# Patient Record
Sex: MALE | Race: WHITE | NOT HISPANIC OR LATINO | Employment: FULL TIME | URBAN - METROPOLITAN AREA
[De-identification: names, ages, dates, MRNs, and addresses within clinical notes are randomized per-mention and may not be internally consistent; named-entity substitution may affect disease eponyms.]

---

## 2017-04-19 ENCOUNTER — MEDICAL CORRESPONDENCE (OUTPATIENT)
Dept: TRANSPLANT | Facility: CLINIC | Age: 42
End: 2017-04-19

## 2017-08-10 ENCOUNTER — TRANSFERRED RECORDS (OUTPATIENT)
Dept: HEALTH INFORMATION MANAGEMENT | Facility: CLINIC | Age: 42
End: 2017-08-10

## 2017-12-18 ENCOUNTER — TRANSFERRED RECORDS (OUTPATIENT)
Dept: HEALTH INFORMATION MANAGEMENT | Facility: CLINIC | Age: 42
End: 2017-12-18

## 2018-04-16 ENCOUNTER — TRANSFERRED RECORDS (OUTPATIENT)
Dept: HEALTH INFORMATION MANAGEMENT | Facility: CLINIC | Age: 43
End: 2018-04-16

## 2018-08-17 ENCOUNTER — TRANSFERRED RECORDS (OUTPATIENT)
Dept: HEALTH INFORMATION MANAGEMENT | Facility: CLINIC | Age: 43
End: 2018-08-17

## 2019-06-11 ENCOUNTER — TRANSFERRED RECORDS (OUTPATIENT)
Dept: HEALTH INFORMATION MANAGEMENT | Facility: CLINIC | Age: 44
End: 2019-06-11

## 2019-11-03 ENCOUNTER — HEALTH MAINTENANCE LETTER (OUTPATIENT)
Age: 44
End: 2019-11-03

## 2019-12-19 ENCOUNTER — TRANSFERRED RECORDS (OUTPATIENT)
Dept: HEALTH INFORMATION MANAGEMENT | Facility: CLINIC | Age: 44
End: 2019-12-19

## 2020-11-16 ENCOUNTER — HEALTH MAINTENANCE LETTER (OUTPATIENT)
Age: 45
End: 2020-11-16

## 2020-12-07 ENCOUNTER — TRANSFERRED RECORDS (OUTPATIENT)
Dept: HEALTH INFORMATION MANAGEMENT | Facility: CLINIC | Age: 45
End: 2020-12-07

## 2021-09-18 ENCOUNTER — HEALTH MAINTENANCE LETTER (OUTPATIENT)
Age: 46
End: 2021-09-18

## 2022-01-08 ENCOUNTER — HEALTH MAINTENANCE LETTER (OUTPATIENT)
Age: 47
End: 2022-01-08

## 2022-03-22 ENCOUNTER — HOSPITAL ENCOUNTER (OUTPATIENT)
Dept: GENERAL RADIOLOGY | Facility: CLINIC | Age: 47
Discharge: HOME | End: 2022-03-22
Attending: INTERNAL MEDICINE | Admitting: INTERNAL MEDICINE
Payer: COMMERCIAL

## 2022-03-22 DIAGNOSIS — R05.3 PERSISTENT COUGH: ICD-10-CM

## 2022-03-22 DIAGNOSIS — C90.01 MULTIPLE MYELOMA IN REMISSION (H): ICD-10-CM

## 2022-03-22 PROCEDURE — 71046 X-RAY EXAM CHEST 2 VIEWS: CPT

## 2022-11-20 ENCOUNTER — HEALTH MAINTENANCE LETTER (OUTPATIENT)
Age: 47
End: 2022-11-20

## 2023-01-25 RX ORDER — CLOBETASOL PROPIONATE 0.5 MG/G
CREAM TOPICAL
COMMUNITY
Start: 2022-06-27

## 2023-01-25 RX ORDER — CALCIPOTRIENE 50 UG/G
OINTMENT TOPICAL
COMMUNITY
Start: 2022-02-15

## 2023-01-26 ENCOUNTER — HOSPITAL ENCOUNTER (OUTPATIENT)
Facility: CLINIC | Age: 48
Discharge: HOME | End: 2023-01-26
Attending: OTOLARYNGOLOGY | Admitting: OTOLARYNGOLOGY
Payer: COMMERCIAL

## 2023-01-26 ENCOUNTER — ANESTHESIA (OUTPATIENT)
Dept: SURGERY | Facility: CLINIC | Age: 48
End: 2023-01-26
Payer: COMMERCIAL

## 2023-01-26 ENCOUNTER — ANESTHESIA EVENT (OUTPATIENT)
Dept: SURGERY | Facility: CLINIC | Age: 48
End: 2023-01-26
Payer: COMMERCIAL

## 2023-01-26 VITALS
BODY MASS INDEX: 26.91 KG/M2 | HEART RATE: 65 BPM | HEIGHT: 72 IN | SYSTOLIC BLOOD PRESSURE: 123 MMHG | RESPIRATION RATE: 16 BRPM | DIASTOLIC BLOOD PRESSURE: 78 MMHG | WEIGHT: 198.7 LBS | OXYGEN SATURATION: 98 % | TEMPERATURE: 97.5 F

## 2023-01-26 DIAGNOSIS — C09.9 SQUAMOUS CELL CARCINOMA OF LEFT TONSIL (H): Primary | ICD-10-CM

## 2023-01-26 LAB
LAB DIRECTOR COMMENTS: ABNORMAL
LAB DIRECTOR DISCLAIMER: ABNORMAL
LAB DIRECTOR INTERPRETATION: ABNORMAL
LAB DIRECTOR METHODOLOGY: ABNORMAL
LAB DIRECTOR RESULTS: ABNORMAL
SPECIMEN DESCRIPTION: ABNORMAL

## 2023-01-26 PROCEDURE — 250N000009 HC RX 250: Performed by: NURSE ANESTHETIST, CERTIFIED REGISTERED

## 2023-01-26 PROCEDURE — 250N000013 HC RX MED GY IP 250 OP 250 PS 637: Performed by: ANESTHESIOLOGY

## 2023-01-26 PROCEDURE — 710N000009 HC RECOVERY PHASE 1, LEVEL 1, PER MIN: Performed by: OTOLARYNGOLOGY

## 2023-01-26 PROCEDURE — 710N000012 HC RECOVERY PHASE 2, PER MINUTE: Performed by: OTOLARYNGOLOGY

## 2023-01-26 PROCEDURE — 360N000076 HC SURGERY LEVEL 3, PER MIN: Performed by: OTOLARYNGOLOGY

## 2023-01-26 PROCEDURE — 250N000009 HC RX 250: Performed by: OTOLARYNGOLOGY

## 2023-01-26 PROCEDURE — 258N000003 HC RX IP 258 OP 636: Performed by: ANESTHESIOLOGY

## 2023-01-26 PROCEDURE — 250N000025 HC SEVOFLURANE, PER MIN: Performed by: OTOLARYNGOLOGY

## 2023-01-26 PROCEDURE — G0452 MOLECULAR PATHOLOGY INTERPR: HCPCS | Mod: 26 | Performed by: STUDENT IN AN ORGANIZED HEALTH CARE EDUCATION/TRAINING PROGRAM

## 2023-01-26 PROCEDURE — 88342 IMHCHEM/IMCYTCHM 1ST ANTB: CPT | Mod: 26 | Performed by: PATHOLOGY

## 2023-01-26 PROCEDURE — 250N000011 HC RX IP 250 OP 636: Performed by: NURSE ANESTHETIST, CERTIFIED REGISTERED

## 2023-01-26 PROCEDURE — 370N000017 HC ANESTHESIA TECHNICAL FEE, PER MIN: Performed by: OTOLARYNGOLOGY

## 2023-01-26 PROCEDURE — 87624 HPV HI-RISK TYP POOLED RSLT: CPT | Performed by: OTOLARYNGOLOGY

## 2023-01-26 PROCEDURE — 88360 TUMOR IMMUNOHISTOCHEM/MANUAL: CPT | Mod: 26 | Performed by: PATHOLOGY

## 2023-01-26 PROCEDURE — 272N000001 HC OR GENERAL SUPPLY STERILE: Performed by: OTOLARYNGOLOGY

## 2023-01-26 PROCEDURE — 88304 TISSUE EXAM BY PATHOLOGIST: CPT | Mod: TC | Performed by: OTOLARYNGOLOGY

## 2023-01-26 PROCEDURE — 88304 TISSUE EXAM BY PATHOLOGIST: CPT | Mod: 26 | Performed by: PATHOLOGY

## 2023-01-26 PROCEDURE — 999N000141 HC STATISTIC PRE-PROCEDURE NURSING ASSESSMENT: Performed by: OTOLARYNGOLOGY

## 2023-01-26 RX ORDER — ONDANSETRON 4 MG/1
4 TABLET, ORALLY DISINTEGRATING ORAL EVERY 30 MIN PRN
Status: DISCONTINUED | OUTPATIENT
Start: 2023-01-26 | End: 2023-01-26 | Stop reason: HOSPADM

## 2023-01-26 RX ORDER — NALOXONE HYDROCHLORIDE 0.4 MG/ML
0.2 INJECTION, SOLUTION INTRAMUSCULAR; INTRAVENOUS; SUBCUTANEOUS
Status: DISCONTINUED | OUTPATIENT
Start: 2023-01-26 | End: 2023-01-26 | Stop reason: HOSPADM

## 2023-01-26 RX ORDER — NALOXONE HYDROCHLORIDE 0.4 MG/ML
0.4 INJECTION, SOLUTION INTRAMUSCULAR; INTRAVENOUS; SUBCUTANEOUS
Status: DISCONTINUED | OUTPATIENT
Start: 2023-01-26 | End: 2023-01-26 | Stop reason: HOSPADM

## 2023-01-26 RX ORDER — FENTANYL CITRATE 50 UG/ML
50 INJECTION, SOLUTION INTRAMUSCULAR; INTRAVENOUS EVERY 5 MIN PRN
Status: DISCONTINUED | OUTPATIENT
Start: 2023-01-26 | End: 2023-01-26 | Stop reason: HOSPADM

## 2023-01-26 RX ORDER — SODIUM CHLORIDE, SODIUM LACTATE, POTASSIUM CHLORIDE, CALCIUM CHLORIDE 600; 310; 30; 20 MG/100ML; MG/100ML; MG/100ML; MG/100ML
INJECTION, SOLUTION INTRAVENOUS CONTINUOUS
Status: DISCONTINUED | OUTPATIENT
Start: 2023-01-26 | End: 2023-01-26 | Stop reason: HOSPADM

## 2023-01-26 RX ORDER — LIDOCAINE 40 MG/G
CREAM TOPICAL
Status: DISCONTINUED | OUTPATIENT
Start: 2023-01-26 | End: 2023-01-26 | Stop reason: HOSPADM

## 2023-01-26 RX ORDER — ONDANSETRON 2 MG/ML
4 INJECTION INTRAMUSCULAR; INTRAVENOUS EVERY 30 MIN PRN
Status: DISCONTINUED | OUTPATIENT
Start: 2023-01-26 | End: 2023-01-26 | Stop reason: HOSPADM

## 2023-01-26 RX ORDER — ONDANSETRON 8 MG/1
8 TABLET, ORALLY DISINTEGRATING ORAL EVERY 8 HOURS PRN
Qty: 10 TABLET | Refills: 1 | Status: SHIPPED | OUTPATIENT
Start: 2023-01-26

## 2023-01-26 RX ORDER — HYDROMORPHONE HCL IN WATER/PF 6 MG/30 ML
0.4 PATIENT CONTROLLED ANALGESIA SYRINGE INTRAVENOUS EVERY 5 MIN PRN
Status: DISCONTINUED | OUTPATIENT
Start: 2023-01-26 | End: 2023-01-26 | Stop reason: HOSPADM

## 2023-01-26 RX ORDER — ONDANSETRON 4 MG/1
4 TABLET, ORALLY DISINTEGRATING ORAL
Status: DISCONTINUED | OUTPATIENT
Start: 2023-01-26 | End: 2023-01-26 | Stop reason: HOSPADM

## 2023-01-26 RX ORDER — HYDROMORPHONE HCL IN WATER/PF 6 MG/30 ML
0.2 PATIENT CONTROLLED ANALGESIA SYRINGE INTRAVENOUS EVERY 5 MIN PRN
Status: DISCONTINUED | OUTPATIENT
Start: 2023-01-26 | End: 2023-01-26 | Stop reason: HOSPADM

## 2023-01-26 RX ORDER — LIDOCAINE HYDROCHLORIDE AND EPINEPHRINE 10; 10 MG/ML; UG/ML
INJECTION, SOLUTION INFILTRATION; PERINEURAL PRN
Status: DISCONTINUED | OUTPATIENT
Start: 2023-01-26 | End: 2023-01-26 | Stop reason: HOSPADM

## 2023-01-26 RX ORDER — DEXAMETHASONE SODIUM PHOSPHATE 4 MG/ML
INJECTION, SOLUTION INTRA-ARTICULAR; INTRALESIONAL; INTRAMUSCULAR; INTRAVENOUS; SOFT TISSUE PRN
Status: DISCONTINUED | OUTPATIENT
Start: 2023-01-26 | End: 2023-01-26

## 2023-01-26 RX ORDER — ONDANSETRON 2 MG/ML
INJECTION INTRAMUSCULAR; INTRAVENOUS PRN
Status: DISCONTINUED | OUTPATIENT
Start: 2023-01-26 | End: 2023-01-26

## 2023-01-26 RX ORDER — FENTANYL CITRATE 50 UG/ML
25 INJECTION, SOLUTION INTRAMUSCULAR; INTRAVENOUS EVERY 5 MIN PRN
Status: DISCONTINUED | OUTPATIENT
Start: 2023-01-26 | End: 2023-01-26 | Stop reason: HOSPADM

## 2023-01-26 RX ORDER — ACETAMINOPHEN 325 MG/1
975 TABLET ORAL ONCE
Status: DISCONTINUED | OUTPATIENT
Start: 2023-01-26 | End: 2023-01-26 | Stop reason: HOSPADM

## 2023-01-26 RX ORDER — OXYCODONE HCL 5 MG/5 ML
5-10 SOLUTION, ORAL ORAL EVERY 6 HOURS PRN
Qty: 300 ML | Refills: 0 | Status: SHIPPED | OUTPATIENT
Start: 2023-01-26 | End: 2023-01-31

## 2023-01-26 RX ORDER — PROPOFOL 10 MG/ML
INJECTION, EMULSION INTRAVENOUS PRN
Status: DISCONTINUED | OUTPATIENT
Start: 2023-01-26 | End: 2023-01-26

## 2023-01-26 RX ORDER — OXYCODONE HYDROCHLORIDE 5 MG/1
5 TABLET ORAL EVERY 4 HOURS PRN
Status: DISCONTINUED | OUTPATIENT
Start: 2023-01-26 | End: 2023-01-26 | Stop reason: HOSPADM

## 2023-01-26 RX ORDER — LIDOCAINE HYDROCHLORIDE 10 MG/ML
INJECTION, SOLUTION INFILTRATION; PERINEURAL PRN
Status: DISCONTINUED | OUTPATIENT
Start: 2023-01-26 | End: 2023-01-26

## 2023-01-26 RX ORDER — MAGNESIUM HYDROXIDE 1200 MG/15ML
LIQUID ORAL PRN
Status: DISCONTINUED | OUTPATIENT
Start: 2023-01-26 | End: 2023-01-26 | Stop reason: HOSPADM

## 2023-01-26 RX ORDER — DEXAMETHASONE SODIUM PHOSPHATE 4 MG/ML
10 INJECTION, SOLUTION INTRA-ARTICULAR; INTRALESIONAL; INTRAMUSCULAR; INTRAVENOUS; SOFT TISSUE ONCE
Status: DISCONTINUED | OUTPATIENT
Start: 2023-01-26 | End: 2023-01-26 | Stop reason: HOSPADM

## 2023-01-26 RX ORDER — METOPROLOL TARTRATE 1 MG/ML
1-2 INJECTION, SOLUTION INTRAVENOUS EVERY 5 MIN PRN
Status: DISCONTINUED | OUTPATIENT
Start: 2023-01-26 | End: 2023-01-26 | Stop reason: HOSPADM

## 2023-01-26 RX ORDER — FENTANYL CITRATE 50 UG/ML
INJECTION, SOLUTION INTRAMUSCULAR; INTRAVENOUS PRN
Status: DISCONTINUED | OUTPATIENT
Start: 2023-01-26 | End: 2023-01-26

## 2023-01-26 RX ORDER — HYDROCODONE BITARTRATE AND ACETAMINOPHEN 7.5; 325 MG/15ML; MG/15ML
10 SOLUTION ORAL
Status: DISCONTINUED | OUTPATIENT
Start: 2023-01-26 | End: 2023-01-26 | Stop reason: HOSPADM

## 2023-01-26 RX ORDER — FENTANYL CITRATE 50 UG/ML
50 INJECTION, SOLUTION INTRAMUSCULAR; INTRAVENOUS
Status: DISCONTINUED | OUTPATIENT
Start: 2023-01-26 | End: 2023-01-26 | Stop reason: HOSPADM

## 2023-01-26 RX ORDER — ACETAMINOPHEN 325 MG/1
975 TABLET ORAL ONCE
Status: COMPLETED | OUTPATIENT
Start: 2023-01-26 | End: 2023-01-26

## 2023-01-26 RX ORDER — HYDRALAZINE HYDROCHLORIDE 20 MG/ML
2.5-5 INJECTION INTRAMUSCULAR; INTRAVENOUS EVERY 10 MIN PRN
Status: DISCONTINUED | OUTPATIENT
Start: 2023-01-26 | End: 2023-01-26 | Stop reason: HOSPADM

## 2023-01-26 RX ORDER — OXYCODONE HYDROCHLORIDE 5 MG/1
10 TABLET ORAL EVERY 4 HOURS PRN
Status: DISCONTINUED | OUTPATIENT
Start: 2023-01-26 | End: 2023-01-26 | Stop reason: HOSPADM

## 2023-01-26 RX ORDER — ACETAMINOPHEN 325 MG/1
650 TABLET ORAL
Status: DISCONTINUED | OUTPATIENT
Start: 2023-01-26 | End: 2023-01-26 | Stop reason: HOSPADM

## 2023-01-26 RX ADMIN — FENTANYL CITRATE 100 MCG: 50 INJECTION, SOLUTION INTRAMUSCULAR; INTRAVENOUS at 12:54

## 2023-01-26 RX ADMIN — ROCURONIUM BROMIDE 30 MG: 50 INJECTION, SOLUTION INTRAVENOUS at 12:36

## 2023-01-26 RX ADMIN — DEXAMETHASONE SODIUM PHOSPHATE 2 MG: 4 INJECTION, SOLUTION INTRA-ARTICULAR; INTRALESIONAL; INTRAMUSCULAR; INTRAVENOUS; SOFT TISSUE at 13:24

## 2023-01-26 RX ADMIN — ACETAMINOPHEN 975 MG: 325 TABLET, FILM COATED ORAL at 14:09

## 2023-01-26 RX ADMIN — LIDOCAINE HYDROCHLORIDE 50 MG: 10 INJECTION, SOLUTION INFILTRATION; PERINEURAL at 12:36

## 2023-01-26 RX ADMIN — FENTANYL CITRATE 150 MCG: 50 INJECTION, SOLUTION INTRAMUSCULAR; INTRAVENOUS at 12:36

## 2023-01-26 RX ADMIN — ROCURONIUM BROMIDE 10 MG: 50 INJECTION, SOLUTION INTRAVENOUS at 12:57

## 2023-01-26 RX ADMIN — SODIUM CHLORIDE, POTASSIUM CHLORIDE, SODIUM LACTATE AND CALCIUM CHLORIDE: 600; 310; 30; 20 INJECTION, SOLUTION INTRAVENOUS at 11:37

## 2023-01-26 RX ADMIN — PROPOFOL 200 MG: 10 INJECTION, EMULSION INTRAVENOUS at 12:36

## 2023-01-26 RX ADMIN — SODIUM CHLORIDE, POTASSIUM CHLORIDE, SODIUM LACTATE AND CALCIUM CHLORIDE: 600; 310; 30; 20 INJECTION, SOLUTION INTRAVENOUS at 13:25

## 2023-01-26 RX ADMIN — ONDANSETRON 4 MG: 2 INJECTION INTRAMUSCULAR; INTRAVENOUS at 13:26

## 2023-01-26 RX ADMIN — PROPOFOL 50 MG: 10 INJECTION, EMULSION INTRAVENOUS at 12:55

## 2023-01-26 RX ADMIN — MIDAZOLAM 2 MG: 1 INJECTION INTRAMUSCULAR; INTRAVENOUS at 12:30

## 2023-01-26 RX ADMIN — DEXAMETHASONE SODIUM PHOSPHATE 8 MG: 4 INJECTION, SOLUTION INTRA-ARTICULAR; INTRALESIONAL; INTRAMUSCULAR; INTRAVENOUS; SOFT TISSUE at 12:36

## 2023-01-26 RX ADMIN — SUGAMMADEX 200 MG: 100 INJECTION, SOLUTION INTRAVENOUS at 13:29

## 2023-01-26 ASSESSMENT — ACTIVITIES OF DAILY LIVING (ADL)
ADLS_ACUITY_SCORE: 37

## 2023-01-26 NOTE — ANESTHESIA POSTPROCEDURE EVALUATION
Patient: Raphael Menon    Procedure: Procedure(s):  Tonsillectomy  Direct laryngoscopy with esophagoscopy       Anesthesia Type:  General    Note:  Disposition: Outpatient   Postop Pain Control: Uneventful            Sign Out: Well controlled pain   PONV: No   Neuro/Psych: Uneventful            Sign Out: Acceptable/Baseline neuro status   Airway/Respiratory: Uneventful            Sign Out: Acceptable/Baseline resp. status   CV/Hemodynamics: Uneventful            Sign Out: Acceptable CV status; No obvious hypovolemia; No obvious fluid overload   Other NRE: NONE   DID A NON-ROUTINE EVENT OCCUR? No           Last vitals:  Vitals Value Taken Time   /99 01/26/23 1409   Temp 98  F (36.7  C) 01/26/23 1409   Pulse 72 01/26/23 1420   Resp 14 01/26/23 1420   SpO2 99 % 01/26/23 1420   Vitals shown include unvalidated device data.    Electronically Signed By: Khadar Gandara MD  January 26, 2023  2:42 PM

## 2023-01-26 NOTE — BRIEF OP NOTE
[facility]    Brief Operative Note    Pre-operative diagnosis: Tonsillar mass [J35.8]  Post-operative diagnosis tonsil cancer    Procedure: Procedure(s):  Tonsillectomy  Direct laryngoscopy with esophagoscopy  Surgeon: Surgeon(s) and Role:     * Ryan Acosta MD - Primary  Anesthesia: General   Estimated Blood Loss: 25 mL from 1/26/2023 12:29 PM to 1/26/2023  1:35 PM      Drains: None  Specimens:   ID Type Source Tests Collected by Time Destination   1 : left tonsil Tissue Tonsil, Left SURGICAL PATHOLOGY EXAM Ryan Acosta MD 1/26/2023 12:51 PM    2 : right tonsil Tissue Tonsil, Right SURGICAL PATHOLOGY EXAM Ryan Acosta MD 1/26/2023 12:51 PM      Findings:   left tonsil appears to have cancer  Complications: None.  Implants: * No implants in log *      Ryan Acosta M.D.

## 2023-01-26 NOTE — PROGRESS NOTES
"SPIRITUAL HEALTH SERVICES Progress Note  RH Pre-Op    Saw pt Raphael AMATO Lorenajosesito per his request for  support before his procedure.  Macario reported that he was recently diagnosed with throat cancer and is having his tonsils removed today.  He expressed concern for his 11-year old and 9-year old daughters, \" I have to bring them along this journey.\"  Macario named his spouse, extended family, and friends as being part of his support network.  He identifies as \"a person of mey\" and is affiliated with [facility] in [place].  Macario welcomed prayer.     Plan: No further plans as pt anticipates discharging home after his procedure.    Arie Marquez M.Div., [facility]  Staff     [facility] routine referrals *[phone]  [facility] available 24/7 for emergent requests/referrals, either by paging the on-call  or by entering an ASAP/STAT consult in Epic (this will also page the on-call ).  "

## 2023-01-26 NOTE — ANESTHESIA PROCEDURE NOTES
Airway         Procedure Start/Stop Times: 1/26/2023 12:39 PM  Staff -        Anesthesiologist:  Khadar Gandara MD       CRNA: Parisa Hartman APRN CRNA       Performed By: anesthesiologist  Consent for Airway        Urgency: elective  Indications and Patient Condition       Indications for airway management: claude-procedural       Induction type:intravenous       Mask difficulty assessment: 1 - vent by mask    Final Airway Details       Final airway type: endotracheal airway       Successful airway: ETT - single, Oral and HANNAH  Endotracheal Airway Details        ETT size (mm): 8.0       Cuffed: yes       Successful intubation technique: direct laryngoscopy       DL Blade Type: MAC 3       Grade View of Cords: 1       Adjucts: stylet       Position: Left       Bite block used: None    Post intubation assessment        Placement verified by: capnometry, equal breath sounds and chest rise        Number of attempts at approach: 1       Number of other approaches attempted: 0       Secured with: plastic tape       Ease of procedure: easy       Dentition: Unchanged    Medication(s) Administered   Medication Administration Time: 1/26/2023 12:39 PM

## 2023-01-26 NOTE — ANESTHESIA CARE TRANSFER NOTE
Patient: Raphael Menon    Procedure: Procedure(s):  Tonsillectomy  Direct laryngoscopy with esophagoscopy       Diagnosis: Tonsillar mass [J35.8]  Diagnosis Additional Information: No value filed.    Anesthesia Type:   General     Note:    Oropharynx: oropharynx clear of all foreign objects and spontaneously breathing  Level of Consciousness: awake  Oxygen Supplementation: face mask  Level of Supplemental Oxygen (L/min / FiO2): 8  Independent Airway: airway patency satisfactory and stable  Dentition: dentition unchanged  Vital Signs Stable: post-procedure vital signs reviewed and stable  Report to RN Given: handoff report given  Patient transferred to: PACU  Comments: Pt to recovery.  Spontaneous respirations and exchanging well.  Pt making needs known.  Report given to RN.    Handoff Report: Identifed the Patient, Identified the Reponsible Provider, Reviewed the pertinent medical history, Discussed the surgical course, Reviewed Intra-OP anesthesia mangement and issues during anesthesia, Set expectations for post-procedure period and Allowed opportunity for questions and acknowledgement of understanding      Vitals:  Vitals Value Taken Time   BP     Temp     Pulse 89 01/26/23 1339   Resp 12 01/26/23 1339   SpO2 100 % 01/26/23 1339   Vitals shown include unvalidated device data.    Electronically Signed By: SUE Rodriguez CRNA  January 26, 2023  1:40 PM

## 2023-01-26 NOTE — ANESTHESIA PREPROCEDURE EVALUATION
Anesthesia Pre-Procedure Evaluation    Patient: Raphael Menon   MRN: [id number] : 1975        Procedure : Procedure(s):  Tonsillectomy  Direct laryngoscopy with esophagoscopy          Past Medical History:   Diagnosis Date     Cancer (H)     multiple myeloma     History of blood transfusion       Past Surgical History:   Procedure Laterality Date     BONE MARROW BIOPSY, BONE SPECIMEN, NEEDLE/TROCAR  2013    Procedure: BIOPSY BONE MARROW;  Bilateral Bone Marrow Biopsy;  Surgeon: Kirby Linares MD;  Location: [facility]     BONE MARROW BIOPSY, BONE SPECIMEN, NEEDLE/TROCAR  2013    Procedure: BIOPSY BONE MARROW;;  Surgeon: Kirby Linares MD;  Location:  GI     HEAD & NECK SURGERY      wisdom teeth pulled      Allergies   Allergen Reactions     Penicillins Rash      Social History     Tobacco Use     Smoking status: Never     Smokeless tobacco: Never   Substance Use Topics     Alcohol use: Yes     Comment: occas      Wt Readings from Last 1 Encounters:   23 90.1 kg (198 lb 11.2 oz)        Anesthesia Evaluation            ROS/MED HX  ENT/Pulmonary:  - neg pulmonary ROS  (-) sleep apnea   Neurologic:       Cardiovascular:  - neg cardiovascular ROS     METS/Exercise Tolerance:     Hematologic:       Musculoskeletal:       GI/Hepatic:    (-) GERD   Renal/Genitourinary:       Endo:       Psychiatric/Substance Use:     (+) psychiatric history anxiety     Infectious Disease:       Malignancy:   (+) Malignancy (MM), History of Other.    Other:            Physical Exam    Airway        Mallampati: II   TM distance: > 3 FB   Neck ROM: full     Respiratory Devices and Support         Dental           Cardiovascular          Rhythm and rate: regular and normal     Pulmonary           breath sounds clear to auscultation           OUTSIDE LABS:  CBC:   Lab Results   Component Value Date    WBC 4.0 12/10/2015    WBC 2.1 (L) 2014    HGB 14.8 12/10/2015    HGB 14.5 2014    HCT 41.9  12/10/2015    HCT 41.8 12/09/2014     12/10/2015     (L) 12/09/2014     BMP:   Lab Results   Component Value Date     12/10/2015     12/09/2014    POTASSIUM 3.7 12/10/2015    POTASSIUM 3.6 12/09/2014    CHLORIDE 110 (H) 12/10/2015    CHLORIDE 106 12/09/2014    CO2 22 12/10/2015    CO2 26 12/09/2014    BUN 15 12/10/2015    BUN 14 12/09/2014    CR 0.96 12/10/2015    CR 1.14 12/09/2014     (H) 12/10/2015    GLC 85 12/09/2014     COAGS:   Lab Results   Component Value Date    PTT 36 12/18/2013    INR 1.09 12/23/2013     POC: No results found for: BGM, HCG, HCGS  HEPATIC:   Lab Results   Component Value Date    ALBUMIN 3.7 12/10/2015    PROTTOTAL 6.9 12/10/2015    ALT 41 12/10/2015    AST 20 12/10/2015    ALKPHOS 54 12/10/2015    BILITOTAL 0.8 12/10/2015     OTHER:   Lab Results   Component Value Date    PH 7.46 (H) 11/15/2013    LACT 0.7 12/19/2013    DANIELLE 8.7 12/10/2015    PHOS 4.0 12/18/2013    MAG 2.1 01/02/2014    TSH 0.99 12/10/2015    T4 1.03 12/10/2015       Anesthesia Plan    ASA Status:  3   NPO Status:  NPO Appropriate    Anesthesia Type: General.     - Airway: ETT   Induction: Intravenous.   Maintenance: Balanced.        Consents    Anesthesia Plan(s) and associated risks, benefits, and realistic alternatives discussed. Questions answered and patient/representative(s) expressed understanding.    - Discussed:     - Discussed with:  Patient         Postoperative Care    Pain management: IV analgesics, Oral pain medications, Multi-modal analgesia.   PONV prophylaxis: Dexamethasone or Solumedrol, Ondansetron (or other 5HT-3)     Comments:                Khadar Gandara MD

## 2023-01-26 NOTE — DISCHARGE INSTRUCTIONS
Maximum acetaminophen (Tylenol) dose from all sources should not exceed 4 grams (4000 mg) per day. Tylenol given at 12:00PM do not take again until 6:00PM     NO NSAID, ASPRIN, or  NAPROXEN at this time.     DO NOT EAT OR DRINK ANYTHING RED AT THIS TIME     TONSILLECTOMY- DR. Acosta  [phone]    These are general guidelines only.  Your case may be different.  If your physician has given you specific instructions, please follow them.    What to Expect After Surgery  Pain  Pain is different for every patient.  Patients will have moderate to severe throat pain after surgery.  The medications will help, but there is no way to avoid pain after surgery.  Many patients may complain of an earache.  This is usually referred pain from the throat and may be especially prominent around day six or seven after surgery, which is often when throat pain from tonsillectomy peaks.  It's best if you take your pain medication routinely for the first week.    Nausea and Vomiting  If the patient is nauseated or vomiting, give clear liquids only and avoid dairy products or other fatty foods.  Use plain Tylenol (acetaminophen) instead of the narcotic pain medication.  Hold narcotic until nausea has passed and oral intake has improved.  If nausea persists, contact your surgeon.    Fever  It is normal to run a low grade fever for up to 10 days after surgery.  This may occasionally be as high as 101F degrees.  Contact your surgeon for a persisting fever above 101.5F that does not get better with Tylenol (acetaminophen).    Breathing  Parents/caregivers may notice snoring or mouth breathing.  This is due to swelling in the throat.  Breathing should return to normal when swelling subsides, 10-14 days after surgery.  A humidifier may help soothe the throat at night.    Scabs  Scabs will form where the tonsils and adenoids were removed.  These scabs are white and can cause bad breath.  Most scabs begin to fall away about 1 week after surgery.   Sometimes you may see a small amount of bloody streaks in saliva as the scabs come off.  This is normal if it is just streaks and does not persist.    Voice  The voice may become clearer and of higher pitch after surgery.  It may also have a  nasal  quality that should improve within several weeks after surgery.    What to Avoid  Avoid citrus juices (which may burn) or hard, sharp, crusty or crunchy foods or hot foods/liquids for 2 weeks.  If tonsillectomy, then avoid any strenuous activity such as heavy lifting, active sports or active play (gym, running, swimming, etc.) for 2 weeks.  If adenoidectomy only (no tonsillectomy), then avoid strenuous activities for 1 week and then may resume normal activities as tolerated.  Only restrictions are no head upside down, no swimming under water and avoid activity in cold dry air (skating, skiing, sledding) for two weeks.  Patients who had an adenoidectomy with or without tonsillectomy should avoid vigorous nose blowing for 2 weeks.    What You May Eat and Drink  Day of surgery - cool, clear liquids as tolerated.  The most important requirement for recovery is for the patient to drink plenty of fluids to avoid dehydration.  Advance to soft food diet then solids as tolerated.  Popular options are smoothies, pastas, soups, mashed potatoes, oatmeal.  When able to eat regular foods, that is okay if soft.  The sooner the patient eats and chews, the quicker the recovery.  Consuming protein helps healing.  Many patients are not back to their normal diet for 10-14 days following surgery.  Straws are okay with thin liquids like juice, water, milk, but recommend avoiding a straw with very thick liquids like a milkshake.    Pain  Most patients have significant pain for about 1 week after surgery.  Over the counter medication such as Tylenol (acetaminophen) and Advil, Motrin (ibuprofen) should be used to relieve any discomfort.  Your doctor may prescribe narcotic pain medication such as  oxycodone or hydrocodone.  Using a combination of over the counter pain medications in addition to prescription pain medication is the most helpful.  Follow directions on the bottle for dosing of these over the counter medications.  If you are able to use more than one pain medication, it is recommended to stagger the medications to try to always have some pain medication on board.  For example, take Tylenol at noon, then prescription pain medication at 2pm, then Tylenol again at 4pm etc.  Give pain medication on a regular schedule for the first week after surgery.  It is best to  stay ahead  of any pain.  Some patients experience nausea from narcotic pain medication.  If that occurs you should stop the prescription pain medication and instead make sure you take over the counter medications regularly.  If severe nausea, you can use nausea medication, or call your doctor to get nausea medication.    Tylenol (acetaminophen) approved after surgery       Yes:_____         No:______    Advil, Motrin (ibuprofen) approved after surgery        Yes:_____         No:______    Ibuprofen may be used in _____ days/weeks.  Do NOT take aspirin for _____ days after surgery as it will increase the chance of bleeding.  Some patients receive a prescription for a narcotic pain medication.  Please use this medication if over the counter pain medications are not controlling the pain.  Give pain medication on a regular schedule for the first 5-7 days after surgery.  It is best to  stay ahead  of any pain.  Due to restrictions on prescribing narcotic medications, we cannot refill prescription pain medications on nights or weekends, or within 5 days of the first prescription being filled at the pharmacy.    When to Call  A persistent fever of 101.5F degrees (by mouth) or higher that does not improve with use of Tylenol.  Difficulty breathing.  Throat bleeding greater than 1 tablespoon of blood, or throat bleeding that does not stop in less  than 5 minutes.  Nausea and vomiting that is persistent.  If you have an emergency such as severe difficulty breathing or severe bleeding, please call 911 or go to the emergency room.    Follow-Up  You may return to work/school in ______days/weeks.  Call your surgeon's office to make an appointment to see them in______days/weeks.  Follow-up already scheduled_________.  Scheduled follow-up not required_______.    GENERAL ANESTHESIA OR SEDATION ADULT DISCHARGE INSTRUCTIONS   SPECIAL PRECAUTIONS FOR 24 HOURS AFTER SURGERY    IT IS NOT UNUSUAL TO FEEL LIGHT-HEADED OR FAINT, UP TO 24 HOURS AFTER SURGERY OR WHILE TAKING PAIN MEDICATION.  IF YOU HAVE THESE SYMPTOMS; SIT FOR A FEW MINUTES BEFORE STANDING AND HAVE SOMEONE ASSIST YOU WHEN YOU GET UP TO WALK OR USE THE BATHROOM.    YOU SHOULD REST AND RELAX FOR THE NEXT 24 HOURS AND YOU MUST MAKE ARRANGEMENTS TO HAVE SOMEONE STAY WITH YOU FOR AT LEAST 24 HOURS AFTER YOUR DISCHARGE.  AVOID HAZARDOUS AND STRENUOUS ACTIVITIES.  DO NOT MAKE IMPORTANT DECISIONS FOR 24 HOURS.    DO NOT DRIVE ANY VEHICLE OR OPERATE MECHANICAL EQUIPMENT FOR 24 HOURS FOLLOWING THE END OF YOUR SURGERY.  EVEN THOUGH YOU MAY FEEL NORMAL, YOUR REACTIONS MAY BE AFFECTED BY THE MEDICATION YOU HAVE RECEIVED.    DO NOT DRINK ALCOHOLIC BEVERAGES FOR 24 HOURS FOLLOWING YOUR SURGERY.    DRINK CLEAR LIQUIDS (APPLE JUICE, GINGER ALE, 7-UP, BROTH, ETC.).  PROGRESS TO YOUR REGULAR DIET AS YOU FEEL ABLE.    YOU MAY HAVE A DRY MOUTH, A SORE THROAT, MUSCLES ACHES OR TROUBLE SLEEPING.  THESE SHOULD GO AWAY AFTER 24 HOURS.    CALL YOUR DOCTOR FOR ANY OF THE FOLLOWING:  SIGNS OF INFECTION (FEVER, GROWING TENDERNESS AT THE SURGERY SITE, A LARGE AMOUNT OF DRAINAGE OR BLEEDING, SEVERE PAIN, FOUL-SMELLING DRAINAGE, REDNESS OR SWELLING.    IT HAS BEEN OVER 8 TO 10 HOURS SINCE SURGERY AND YOU ARE STILL NOT ABLE TO URINATE (PASS WATER).

## 2023-01-27 NOTE — OP NOTE
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    PROCEDURE PERFORMED:  Tonsillectomy, direct laryngoscopy, esophagoscopy.    ANESTHESIA:  General endotracheal.    ESTIMATED BLOOD LOSS:  25 mL.    SPECIMENS:  Both tonsils.    INDICATIONS FOR PROCEDURE:  A 47-year-old male with a history of left throat pain and enlarged lymph node in the left neck.  He also has a history of previous multiple myeloma.  His oncologist had ordered a CT scan, which shows some asymmetry to the left tonsil and also enlarged cervical lymph node in the left neck.  It was recommended that he undergo biopsy of the tonsils with tonsillectomy as well as direct laryngoscopy and esophagoscopy.  Of note, he just had a fine needle aspiration of the left cervical lymph node recently and it showed p16-positive squamous cell carcinoma.  The risks, benefits and alternatives of surgery were discussed in detail.    DESCRIPTION OF PROCEDURE:  The patient was taken to the operating room and placed on the table in the supine position and general endotracheal anesthesia was induced by anesthesia team without difficulty and the table was turned 90 degrees.  A timeout was called.  Head drape was placed and he was sterilely draped.  A tooth guard was placed and then the Dedo laryngoscope was used to expose the larynx and pharynx and also the tonsils were palpated as was the base of tongue and findings consist of mildly enlarged and firm left tonsil.  The remainder of the examination is normal, including normal Piriform sinuses and postcricoid region, normal supraglottic structures and normal vocal cords.  No evidence of any other pharyngeal pathology.  The Dedo laryngoscope was then used to expose the cervical esophagus and the short Jesberg  esophagoscope was used, and there were no abnormalities.  The McIvor mouth gag used to expose the oropharynx and then the left tonsil was grasped with a curved Allis and it was friable and somewhat difficult to get ahold of without tearing the tonsil.  The majority of the tumor seems to be at the superior pole of the tonsil.  The inferior pole is very normal appearing and there is no evidence at all of any base of tongue involvement.  The monopolar cautery was then used to dissect the tonsil from the tonsillar fossa.  The tonsil was larger and the tumor had gone somewhat more laterally.  I followed the capsule and then removed the tonsil, though it did come out in a few pieces as it was friable and difficult to remove in one piece.  Not confident it was a negative margin as the tumor was extending outside of the muscles and a small amount of parapharyngeal fat was exposed with dissection.  The right tonsil was then removed and this tonsil was smaller and with a defined capsule and was removed quickly.  Irrigation was used and suction.  I injected the tonsillar fossae bilaterally with a solution of 1% lidocaine with epinephrine 1:100,000.  The stomach was suctioned with flexible catheter.  The patient was then turned back towards anesthesia for awakening.  There were no immediate complications.    Ryan Acosta MD        D: 2023   T: 2023   MT: [id number]    Name:     JAMIE MCBRIDE  MRN:      [id number]        Account:        [id number]   :      1975           Procedure Date: 2023     Document: [id number]    cc:  MD Prudence Lozano MD

## 2023-01-28 LAB
PATH REPORT.COMMENTS IMP SPEC: ABNORMAL
PATH REPORT.COMMENTS IMP SPEC: ABNORMAL
PATH REPORT.COMMENTS IMP SPEC: YES
PATH REPORT.FINAL DX SPEC: ABNORMAL
PATH REPORT.GROSS SPEC: ABNORMAL
PATH REPORT.MICROSCOPIC SPEC OTHER STN: ABNORMAL
PATH REPORT.RELEVANT HX SPEC: ABNORMAL
PHOTO IMAGE: ABNORMAL

## 2023-03-01 ENCOUNTER — MYC MEDICAL ADVICE (OUTPATIENT)
Dept: INTERVENTIONAL RADIOLOGY/VASCULAR | Facility: CLINIC | Age: 48
End: 2023-03-01
Payer: COMMERCIAL

## 2023-03-02 NOTE — TELEPHONE ENCOUNTER
Patient called, Mychart instructions reviewed and acknowledged.     Patient reports he has a pre-op scheduled with [facility] on Monday 3/6/23. Gave patient IR fax # to have office note sent ([phone]).    Questions answered, no concerns.      Susanne Boo RN  Interventional Radiology  [phone]

## 2023-03-06 ENCOUNTER — TRANSFERRED RECORDS (OUTPATIENT)
Dept: HEALTH INFORMATION MANAGEMENT | Facility: CLINIC | Age: 48
End: 2023-03-06

## 2023-03-07 ENCOUNTER — APPOINTMENT (OUTPATIENT)
Dept: INTERVENTIONAL RADIOLOGY/VASCULAR | Facility: CLINIC | Age: 48
End: 2023-03-07
Attending: INTERNAL MEDICINE
Payer: COMMERCIAL

## 2023-03-07 ENCOUNTER — HOSPITAL ENCOUNTER (OUTPATIENT)
Facility: CLINIC | Age: 48
Discharge: HOME | End: 2023-03-07
Admitting: RADIOLOGY
Payer: COMMERCIAL

## 2023-03-07 VITALS
SYSTOLIC BLOOD PRESSURE: 106 MMHG | BODY MASS INDEX: 26.51 KG/M2 | HEART RATE: 68 BPM | TEMPERATURE: 97.1 F | RESPIRATION RATE: 16 BRPM | WEIGHT: 200 LBS | DIASTOLIC BLOOD PRESSURE: 73 MMHG | HEIGHT: 73 IN | OXYGEN SATURATION: 98 %

## 2023-03-07 DIAGNOSIS — C09.9 TONSIL CANCER (H): ICD-10-CM

## 2023-03-07 LAB
HGB BLD-MCNC: 13.7 G/DL (ref 13.3–17.7)
INR PPP: 1.01 (ref 0.85–1.15)
PLATELET # BLD AUTO: 212 10E3/UL (ref 150–450)

## 2023-03-07 PROCEDURE — 36591 DRAW BLOOD OFF VENOUS DEVICE: CPT

## 2023-03-07 PROCEDURE — 250N000009 HC RX 250: Performed by: PHYSICIAN ASSISTANT

## 2023-03-07 PROCEDURE — 999N000163 HC STATISTIC SIMPLE TUBE INSERTION/CHARGE, PORT, CATH, FISTULOGRAM

## 2023-03-07 PROCEDURE — 99152 MOD SED SAME PHYS/QHP 5/>YRS: CPT

## 2023-03-07 PROCEDURE — 272N000187 HC ACCESSORY CR11

## 2023-03-07 PROCEDURE — 85018 HEMOGLOBIN: CPT | Performed by: PHYSICIAN ASSISTANT

## 2023-03-07 PROCEDURE — 258N000003 HC RX IP 258 OP 636: Performed by: PHYSICIAN ASSISTANT

## 2023-03-07 PROCEDURE — 36415 COLL VENOUS BLD VENIPUNCTURE: CPT | Performed by: PHYSICIAN ASSISTANT

## 2023-03-07 PROCEDURE — 85610 PROTHROMBIN TIME: CPT | Performed by: PHYSICIAN ASSISTANT

## 2023-03-07 PROCEDURE — 272N000116 HC CATH CR1

## 2023-03-07 PROCEDURE — 250N000011 HC RX IP 250 OP 636: Performed by: PHYSICIAN ASSISTANT

## 2023-03-07 PROCEDURE — 85049 AUTOMATED PLATELET COUNT: CPT | Performed by: PHYSICIAN ASSISTANT

## 2023-03-07 RX ORDER — LIDOCAINE 40 MG/G
CREAM TOPICAL
Status: DISCONTINUED | OUTPATIENT
Start: 2023-03-07 | End: 2023-03-07 | Stop reason: HOSPADM

## 2023-03-07 RX ORDER — NALOXONE HYDROCHLORIDE 0.4 MG/ML
0.4 INJECTION, SOLUTION INTRAMUSCULAR; INTRAVENOUS; SUBCUTANEOUS
Status: DISCONTINUED | OUTPATIENT
Start: 2023-03-07 | End: 2023-03-07 | Stop reason: HOSPADM

## 2023-03-07 RX ORDER — SODIUM CHLORIDE 9 MG/ML
INJECTION, SOLUTION INTRAVENOUS CONTINUOUS
Status: DISCONTINUED | OUTPATIENT
Start: 2023-03-07 | End: 2023-03-07 | Stop reason: HOSPADM

## 2023-03-07 RX ORDER — FENTANYL CITRATE 50 UG/ML
25-50 INJECTION, SOLUTION INTRAMUSCULAR; INTRAVENOUS EVERY 5 MIN PRN
Status: DISCONTINUED | OUTPATIENT
Start: 2023-03-07 | End: 2023-03-07 | Stop reason: HOSPADM

## 2023-03-07 RX ORDER — FLUMAZENIL 0.1 MG/ML
0.2 INJECTION, SOLUTION INTRAVENOUS
Status: DISCONTINUED | OUTPATIENT
Start: 2023-03-07 | End: 2023-03-07 | Stop reason: HOSPADM

## 2023-03-07 RX ORDER — NALOXONE HYDROCHLORIDE 0.4 MG/ML
0.2 INJECTION, SOLUTION INTRAMUSCULAR; INTRAVENOUS; SUBCUTANEOUS
Status: DISCONTINUED | OUTPATIENT
Start: 2023-03-07 | End: 2023-03-07 | Stop reason: HOSPADM

## 2023-03-07 RX ORDER — CEFAZOLIN SODIUM 2 G/100ML
2 INJECTION, SOLUTION INTRAVENOUS
Status: COMPLETED | OUTPATIENT
Start: 2023-03-07 | End: 2023-03-07

## 2023-03-07 RX ADMIN — SODIUM CHLORIDE: 9 INJECTION, SOLUTION INTRAVENOUS at 11:47

## 2023-03-07 RX ADMIN — LIDOCAINE HYDROCHLORIDE 20 ML: 10 INJECTION, SOLUTION INFILTRATION; PERINEURAL at 12:47

## 2023-03-07 RX ADMIN — FENTANYL CITRATE 50 MCG: 50 INJECTION, SOLUTION INTRAMUSCULAR; INTRAVENOUS at 12:47

## 2023-03-07 RX ADMIN — MIDAZOLAM 1 MG: 1 INJECTION INTRAMUSCULAR; INTRAVENOUS at 12:47

## 2023-03-07 RX ADMIN — CEFAZOLIN SODIUM 2 G: 2 INJECTION, SOLUTION INTRAVENOUS at 12:04

## 2023-03-07 RX ADMIN — MIDAZOLAM 1 MG: 1 INJECTION INTRAMUSCULAR; INTRAVENOUS at 12:54

## 2023-03-07 RX ADMIN — FENTANYL CITRATE 50 MCG: 50 INJECTION, SOLUTION INTRAMUSCULAR; INTRAVENOUS at 12:54

## 2023-03-07 ASSESSMENT — ACTIVITIES OF DAILY LIVING (ADL)
ADLS_ACUITY_SCORE: 37

## 2023-03-07 NOTE — DISCHARGE INSTRUCTIONS
G-Tube Insertion (New) Discharge Instructions     After you go home:    You should not eat anything or use the tube for 6 hours  You may resume your normal diet after 6 hours  Have an adult stay with you for 6 hours if you received sedation       For 24 hours - due to the sedation you received:  Relax and take it easy  Do NOT make any important or legal decisions  Do NOT drive or operate machines at home or at work  Do NOT drink alcohol    Care of Insertion Site:    For the first 48 hrs, check your puncture site every couple hours while you are awake   You may remove/change the dressing tomorrow  You may shower tomorrow  No tub baths, whirlpools or swimming until your puncture site has fully healed     Activity     You may go back to normal activity in 24 hours   Wait 48 hours before lifting, straining, exercise or other strenuous activity    Medicines:    You may resume all medications  Resume your Warfarin/Coumadin at your regular dose today. Follow up with your provider to have your INR rechecked  Resume your Platelet Inhibitors and Aspirin tomorrow at your regular dose  For minor pain, you may take Acetaminophen (Tylenol) or Ibuprofen (Advil)                 Call the provider who ordered this procedure if:    Blood or fluid is draining from the site  The site is red, swollen, hot, tender or there is foul-smelling drainage  Chills or a fever greater than 101 F (38 C)  Increased pain at the site  Any questions or concerns    Call  911 or go to the Emergency Room if:    Severe pain or trouble breathing  Bleeding that you cannot control        If you have questions call:          [facility] Radiology Dept @ [phone]    Caring for your G-tube    G-tube is sutured in place. Sutures (button) should dissolve within 2 weeks & button will fall off. Gently pull back on the tube & slide the disc down until it is snug against the skin, but too tight.    Please call us at [phone] if you need assistance or  further clarification.    Tube Maintenance:    For ENFit Tubes:    Do NOT over tighten the connections (the purple end & hub connection).    Clean the connecting ends (especially the hub) every day.    If you are unable to disconnect the tubing ends, soak the connection in warm water (or wrap in a wet warm washcloth) to try and loosen the connection.    Possible problems with your tube may include:    Clogged with medications or feedings - most obstructions can be cleared with a small (3cc) syringe and warm water. This may be repeated until the tube is unclogged. This can be prevented by frequently flushing the tube with water (60cc) during the day and always after medications & feedings.    Tube pulls out or falls out -cover the opening with gauze & tape. Call [phone] for further instructions    Skin breakdown and/or yeast infections at the insertion site - use of skin barrier ointments and anti-fungal powders can treat most site irritations.  Ask your pharmacist or provider for assistance (a prescription is not necessary).    In general, tube problems (including pulled tubes) are NOT emergency situations. Unless the pulling out of a tube is accompanied by uncontrollable bleeding, please DO NOT GO TO THE EMERGENCY ROOM!  Call [phone] with problems.    Tube Care:    Change the gauze dressing every 24 hours and if soiled (dirty).  Stabilize all tubes securely by using gauze and tape.  Clean tube site with soap & water using a cotton applicator (Q-tip) as needed to prevent irritation.  Flush feeding tube with 60cc of warm or room temperature water before and after meds.  To prevent the tube from clogging, ask your provider or pharmacist if liquid forms of your medications are available. If not, crush the pills well & be sure to flush the tube before & after all medications.  Flush feeding tube a minimum of every 4 hours and when feeding is completed with 60cc of water to keep the tube clear and avoid  clogging.  Pt may use an abdominal (waist) binder to protect the G-tube.    If there is continued oozing or bleeding, redness, yellow/green/foul smelling drainage    STOP the feedings & use of the tube immediately if there is:    Continued oozing or bleeding at the site  Redness  Yellow/green or foul smelling drainage at the site  Uncontrolled stomach pain    Many of the supplies mentioned above can be purchased at your local pharmacy      For issues with your tube, please call:    [facility] Dept at [phone]

## 2023-03-07 NOTE — PROGRESS NOTES
Care Suites Post Procedure Note    Patient Information  Name: Raphael Menon  Age: 48 year old    Post Procedure  Time patient returned to Care Suites: 1320  Concerns/abnormal assessment: No  If abnormal assessment, provider notified: N/A  Plan/Other: recover from sedation, hydrated with IV fluid    Care Suites Discharge Nursing Note    Discharge Education:  Discharge instructions reviewed: Yes  Additional education/resources provided: all questions answered  Patient/patient representative verbalizes understanding: Yes  Patient discharging on new medications: No  Medication education completed: N/A    Discharge Plans:   Discharge location: home  Discharge ride contacted: Yes  Approximate discharge time: 1402    Discharge Criteria:  Discharge criteria met and vital signs stable: Yes    Patient Belongs:  Patient belongings returned to patient: Yes    Jaja Sadler RN

## 2023-03-07 NOTE — PRE-PROCEDURE
GENERAL PRE-PROCEDURE:   Procedure:  Gastrostomy tube placement  Date/Time:  3/7/2023 11:42 AM    Written consent obtained?: Yes    Risks and benefits: Risks, benefits and alternatives were discussed    Consent given by:  Patient  Patient states understanding of procedure being performed: Yes    Patient's understanding of procedure matches consent: Yes    Procedure consent matches procedure scheduled: Yes    Expected level of sedation:  Moderate  Appropriately NPO:  Yes  ASA Class:  2  Mallampati  :  Grade 2- soft palate, base of uvula, tonsillar pillars, and portion of posterior pharyngeal wall visible  Lungs:  Lungs clear with good breath sounds bilaterally  Heart:  Normal heart sounds and rate  History & Physical reviewed:  History and physical reviewed and no updates needed  Statement of review:  I have reviewed the lab findings, diagnostic data, medications, and the plan for sedation    New dx tonsillar carcinoma

## 2023-03-07 NOTE — IR NOTE
Interventional Radiology Intra-procedural Nursing Note    Patient Name: Raphael Menon  Medical Record Number: [id number]  Today's Date: March 7, 2023    Start Time: 1240  End of procedure time: 1315  Procedure: Gastrostomy tube placement  Report given to: Care Suites RN  Time pt departs:  1319    Other Notes: Pt into IR suite 1 via cart. Pt awake and alert. To table in supine position. Monitoring equipment applied. VSS. Tele SR. Dr. Ochoa in room. Time out and procedure started. Pt tolerated procedure well. Debrief with Dr. Ochoa. G-tube placed and pressure held until hemostasis achieved. Dressing CDI. No complications. Pt transferred back to Care Suites.    Medications:    none    Tommy Mcdonald RN

## 2023-03-07 NOTE — PROGRESS NOTES
Care Suites Admission Nursing Note    Patient Information  Name: Raphael Menon  Age: 48 year old  Reason for admission: G tube placement  Care Suites arrival time: 1100    Patient Admission/Assessment   Pre-procedure assessment complete: Yes  If abnormal assessment/labs, provider notified: N/A  NPO: Yes  Medications held per instructions/orders: N/A  Consent: obtained  If applicable, pregnancy test status: n/a  Patient oriented to room: Yes  Education/questions answered: Yes  Plan/other: Proceed    Discharge Planning  Discharge name/phone number: [facility]) [phone]  Overnight post sedation caregiver: Wife  Discharge location: home    Ana Paula Thomas RN

## 2023-05-17 ENCOUNTER — TELEPHONE (OUTPATIENT)
Dept: INTERVENTIONAL RADIOLOGY/VASCULAR | Facility: CLINIC | Age: 48
End: 2023-05-17
Payer: COMMERCIAL

## 2023-05-18 ENCOUNTER — HOSPITAL ENCOUNTER (OUTPATIENT)
Facility: CLINIC | Age: 48
Discharge: HOME | End: 2023-05-18
Admitting: INTERNAL MEDICINE
Payer: COMMERCIAL

## 2023-05-18 ENCOUNTER — APPOINTMENT (OUTPATIENT)
Dept: INTERVENTIONAL RADIOLOGY/VASCULAR | Facility: CLINIC | Age: 48
End: 2023-05-18
Attending: INTERNAL MEDICINE
Payer: COMMERCIAL

## 2023-05-18 VITALS
OXYGEN SATURATION: 99 % | TEMPERATURE: 98 F | DIASTOLIC BLOOD PRESSURE: 76 MMHG | RESPIRATION RATE: 18 BRPM | SYSTOLIC BLOOD PRESSURE: 126 MMHG | HEART RATE: 68 BPM

## 2023-05-18 DIAGNOSIS — C90.01 MULTIPLE MYELOMA IN REMISSION (H): ICD-10-CM

## 2023-05-18 PROCEDURE — G0463 HOSPITAL OUTPT CLINIC VISIT: HCPCS

## 2023-05-18 PROCEDURE — 999N000154 HC STATISTIC RADIOLOGY XRAY, US, CT, MAR, NM

## 2023-05-18 ASSESSMENT — ACTIVITIES OF DAILY LIVING (ADL): ADLS_ACUITY_SCORE: 37

## 2023-05-18 NOTE — DISCHARGE INSTRUCTIONS
G-tube Removal Discharge Instructions     After you go home:    Do not eat or drink anything for 6 hours.   After 6 hours, remove the dressing from the site and drink a small amount of water. If water or contents leak from the site, stop drinking water and wait 2 more hours. After 2 hours - repeat the process. If there is leaking from the site again - wait another 2 hours to try again.    If there is no leaking from the site, you may start drinking water and eating. Start with a liquid or soft diet.    Care of Insertion Site:    For the first 48 hrs, check your puncture site every couple hours while you are awake   You may change the dressing daily, but more often if dressing becomes wet or dirty. It is not unusual to have drainage from the opening until the site is completely healed.   You may remove the dressing when the site is completely healed   You may shower tomorrow  No  tub baths, whirlpools or swimming until your puncture site has fully healed    Activity     You may go back to normal activity in 24 hours   Wait 48 hours before lifting, straining, exercise or other strenuous activity    Medicines:    You may resume all medications  Resume your Warfarin/Coumadin at your regular dose today. Follow up with your provider to have your INR rechecked  Resume your Platelet Inhibitors and Aspirin tomorrow at your regular dose  For minor pain, you may take Acetaminophen (Tylenol) or Ibuprofen (Advil)                 Call the provider who ordered this procedure if:    Blood or fluid is draining from the site  The site is red, swollen, hot, tender or there is foul-smelling drainage  Chills or a fever greater than 101 F (38 C)  Increased pain at the site  Any questions or concerns    Call  911 or go to the Emergency Room if:    Severe pain or trouble breathing  Bleeding that you cannot control      If you have questions call:       [facility] Radiology Dept @ [phone]        The provider who performed your  procedure was _________________.

## 2023-05-18 NOTE — PROGRESS NOTES
Care Suites Discharge Nursing Note    Patient Information  Name: Raphael Menon  Age: 48 year old    Discharge Education:  Discharge instructions reviewed: Yes  Additional education/resources provided: NA  Patient/patient representative verbalizes understanding: Yes  Patient discharging on new medications: No  Medication education completed: N/A    Discharge Plans:   Discharge location: home  Discharge ride contacted: Yes  Approximate discharge time: 0925    Discharge Criteria:  Discharge criteria met and vital signs stable: Yes. Pt tolerated g tube removal in room, denies pain. OK to discharge per Dominic WATERS.     Patient Belongs:  Patient belongings returned to patient: Yes    Maite Resendiz, RN

## 2023-05-18 NOTE — PROGRESS NOTES
Interventional Radiology - Progress Note  Inpatient - [facility]  5/18/2023    IR Brief Note    Patient had G Tube placed 3/7/23 for nutritional support during treatment for head and neck cancer. Now tolerating PO intake without difficulty. Treatment completed. Referred for removal. G Tube removed without difficulty at bedside after aspiration of 10mL balloon. Dressing applied, all questions answered, and patient discharged to home.    Chris Kenny PA-C  Interventional Radiology  [phone] (IR)  [phone] (GEN Office)      Today time spent on encounter today: 20 minutes

## 2023-05-22 ENCOUNTER — TELEPHONE (OUTPATIENT)
Dept: INTERVENTIONAL RADIOLOGY/VASCULAR | Facility: CLINIC | Age: 48
End: 2023-05-22
Payer: COMMERCIAL

## 2023-05-22 NOTE — TELEPHONE ENCOUNTER
Attempted IR post-procedure call, no answer. LM encouraging call back if any questions, concerns, or issues post-procedure. IR nurse line given.    Post call completed.   May 22, 2023 10:34 AM  Susanne Boo RN  Interventional Radiology  [phone]

## 2023-08-08 ENCOUNTER — TRANSCRIBE ORDERS (OUTPATIENT)
Dept: OTHER | Age: 48
End: 2023-08-08

## 2023-08-08 DIAGNOSIS — C90.00 MULTIPLE MYELOMA (H): Primary | ICD-10-CM

## 2023-08-08 DIAGNOSIS — C09.9 TONSIL CANCER (H): ICD-10-CM

## 2023-08-16 ENCOUNTER — THERAPY VISIT (OUTPATIENT)
Dept: PHYSICAL THERAPY | Facility: CLINIC | Age: 48
End: 2023-08-16
Attending: FAMILY MEDICINE
Payer: COMMERCIAL

## 2023-08-16 DIAGNOSIS — R68.84 JAW PAIN: Primary | ICD-10-CM

## 2023-08-16 PROCEDURE — 97162 PT EVAL MOD COMPLEX 30 MIN: CPT | Mod: GP | Performed by: PHYSICAL THERAPIST

## 2023-08-16 PROCEDURE — 97110 THERAPEUTIC EXERCISES: CPT | Mod: GP | Performed by: PHYSICAL THERAPIST

## 2023-08-16 NOTE — PROGRESS NOTES
PHYSICAL THERAPY EVALUATION  Type of Visit: Evaluation    See electronic medical record for Abuse and Falls Screening details.    Subjective       Presenting condition or subjective complaint: Macario is a pleasant gentleman with h/o tonsil cancer, having completed chemoradiation 4/20/23.  Now Having a hard time opening my mouth very wide, jaw stiffness/pain.  Date of onset: 08/01/22 (jaw discomfort started Aug 2022.) Dx with cancer 3-4 months later.      Relevant medical history: Cancer   Dates & types of surgery: Tonsilectomy Jan 2023    Prior diagnostic imaging/testing results:       Prior therapy history for the same diagnosis, illness or injury: No      Prior Level of Function  Transfers: Independent  Ambulation: Independent  ADL: Independent  IADL: Childcare, Driving, Finances, Housekeeping, Laundry, Meal preparation, Medication management, Work, Yard work    Living Environment  Social support: With a significant other or spouse   Type of home: House   Stairs to enter the home: Yes 8 Is there a railing: Yes   Ramp: No   Stairs inside the home: Yes 8 Is there a railing: Yes   Help at home: None  Equipment owned:   none    Employment: Yes Teacher  Hobbies/Interests: Sports    Patient goals for therapy: Eating is more difficult    Pain assessment:  pain with sudden quick movements of jaw - not rated.  Pt reports at times if he inadvertently opens or closes his mouth quickly he gets sharp pain on L side of jaw.      Objective   TMJ/TMD EVALUATION  ADDITIONAL HISTORY:  Parafunctional habits: Gum chewing - pt had complained of dry mouth when at dentist in July, was recommended to try chewing gum.  Jaw tightness seemed to progress since then.    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    Symptoms reported:  denies locking, clicking, crepitus  Dentist: Last seen in July 2023,  was supposed to go back in August for procedure but had to cancel due to difficulty opening his mouth.    Joint lock:  no locking reported    PAIN: Pain Location: L side of jaw/face  Pain Quality: Sharp and Shooting  Pain Frequency: 5-6 times per day  OBSERVATION/FACIAL SYMMETRY: greater fullness L side of face, questionable edema.  Bogginess around L TMJ  INTEGUMENTARY: intact  POSTURE: Rounded shoulders, Forward head, L scap elevated and abducted    INTRAORAL MOUTH APPLIANCE: No  CERVICAL ROM:  mild decreased SB and rotation to right  with report of tightness/pulling on L side  TMJ ROM:    mm Joint Noise Deviation Pain   Opening Approximately 15 mm      Left Lateral Deviation       Right Lateral Deviation       Protrusion Difficult to measure - reduced      Retrusion         STRENGTH:    Difficulty moving tongue around in mouth, harder to right than left.  Feels pulling on L side when moving tongue to right side.     Left Right   Mid Trap Appear WNL Appear WNL   Lower Trap Appears WNL Appears WNL   Rhomboid Appear WNL Appears WNL     PALPATION:  fullness felt over L TMJ, lateral face and jawline  Assessment & Plan   CLINICAL IMPRESSIONS  Medical Diagnosis: Tonsil cancer,  multiple myeloma    Treatment Diagnosis: jaw tightness, impaired oral motor function   Impression/Assessment: Patient is a 48 year old male with jaw complaints - difficulty opening mouth, pain with sudden movements during eating, talking and difficulty maneuvering tongue.  The following significant findings have been identified: Decreased ROM/flexibility, Decreased joint mobility, Decreased strength, Edema, and Impaired posture. These impairments interfere with their ability to perform self care tasks, work tasks, and recreational activities as compared to previous level of function.     Clinical Decision Making (Complexity):  Clinical Presentation: Evolving/Changing  Clinical Presentation Rationale: based on medical and personal factors listed  in PT evaluation  Clinical Decision Making (Complexity): Moderate complexity    PLAN OF CARE  Treatment Interventions:  Modalities:  per PT    Long Term Goals     PT Goal 1  Goal Identifier: HEP  Goal Description: Macario will be independent in a home exercise and activity program to address his impairments and self manage his functional improvements.  Rationale: to maximize safety and independence with self cares  Target Date: 10/11/23  PT Goal 2  Goal Identifier: Jaw opening  Goal Description: Macario will be able to increase jaw range of motion to open his mouth at least 30 mm in order to improve ease of eating and talking.  Target Date: 10/11/23  PT Goal 3  Goal Identifier: Tongue strength  Goal Description: Macario will improve strength in his tongue in order to reach all areas of the inside and outside of his mouth for proper food clearance and oral hygeine.  Target Date: 10/11/23      Frequency of Treatment: 1 x per week  Duration of Treatment: 6-8 weeks.    Recommended Referrals to Other Professionals: will continue to assess needs and if transfer to TMJ specialist is needed.    Education Assessment:   Learner/Method: Patient;Pictures/Video  Education Comments: initial HEP - AROM tongue, jaw opening stretch,  scap retraction,  MLD L side of face    Risks and benefits of evaluation/treatment have been explained.   Patient/Family/caregiver agrees with Plan of Care.     Evaluation Time:     PT Eval, Moderate Complexity Minutes (41276): 30       Signing Clinician: Tisha Roblero, PT

## 2023-08-23 PROBLEM — R68.84 JAW PAIN: Status: ACTIVE | Noted: 2023-08-23

## 2023-09-14 ENCOUNTER — THERAPY VISIT (OUTPATIENT)
Dept: PHYSICAL THERAPY | Facility: CLINIC | Age: 48
End: 2023-09-14
Attending: FAMILY MEDICINE
Payer: COMMERCIAL

## 2023-09-14 DIAGNOSIS — R68.84 JAW PAIN: Primary | ICD-10-CM

## 2023-09-14 PROCEDURE — 97110 THERAPEUTIC EXERCISES: CPT | Mod: GP | Performed by: PHYSICAL THERAPIST

## 2023-09-27 ENCOUNTER — THERAPY VISIT (OUTPATIENT)
Dept: PHYSICAL THERAPY | Facility: CLINIC | Age: 48
End: 2023-09-27
Attending: FAMILY MEDICINE
Payer: COMMERCIAL

## 2023-09-27 DIAGNOSIS — R68.84 JAW PAIN: Primary | ICD-10-CM

## 2023-09-27 PROCEDURE — 97110 THERAPEUTIC EXERCISES: CPT | Mod: GP | Performed by: PHYSICAL THERAPIST

## 2023-09-27 NOTE — PROGRESS NOTES
Physical Therapy Discharge Note       09/27/23 0500   Appointment Info   Signing clinician's name / credentials Tisha Roblero, PT   Visits Used 3   Medical Diagnosis Tonsil cancer,  multiple myeloma   PT Tx Diagnosis jaw tightness, impaired oral motor function   Precautions/Limitations difficulty eating   Other pertinent information Completed chemoradiation 4/20/2023, tonsillectomy Jan 2023   Progress Note/Certification   Onset of illness/injury or Date of Surgery 08/01/22  (jaw discomfort started Aug 2022.)   Therapy Frequency 1 x per week   Predicted Duration 6-8 weeks.   Progress Note Due Date 11/14/23   GOALS   PT Goals 2;3   PT Goal 1   Goal Identifier HEP   Goal Description Macario will be independent in a home exercise and activity program to address his impairments and self manage his functional improvements.   Rationale to maximize safety and independence with self cares   Goal Progress 9/27/23: verbalizes and demos understanding.  Can't do HEP as frequently as during the summer but tries to do at least 1 x per day.   Target Date 10/11/23   Date Met 09/27/23   PT Goal 2   Goal Identifier Jaw opening   Goal Description Macario will be able to increase jaw range of motion to open his mouth at least 30 mm in order to improve ease of eating and talking.   Rationale to maximize safety and independence with self cares   Goal Progress 9/27:  31 mm   Target Date 10/11/23   Date Met 09/27/23   PT Goal 3   Goal Identifier Tongue strength   Goal Description Macario will improve strength in his tongue in order to reach all areas of the inside and outside of his mouth for proper food clearance and oral hygeine.   Rationale to maximize safety and independence with self cares   Goal Progress Not full strength but improved and is able to clear food adequately.   Target Date 10/11/23   Date Met 09/27/23   Subjective Report   Subjective Report States things are about the same as far as opening mouth.  Eating / talking going ok, no  pain.  Doesn't have as much time during the day to devote to exercises now that school has started again.  Feeling overall things are better but not normal yet.   Objective Measure 1   Objective Measure jaw ROM   Details opening 31 mm per therabite measurement tool   Therapeutic Procedure/Exercise   Therapeutic Procedures: strength, endurance, ROM, flexibillity minutes (21619) 30   Ther Proc 1 enforced seated scap retract and UT , rotation s tretches bilaterally.  Pt demonstrated - verbal and demo cues for proper alignment/upright posture.  Added isometric jaw closing in addition to isometric opening.  hold 3-5 sec x 5.   enforced other ex in HEP - doing consistently will help, even if can only do 1 x per day.  tightness may worsen or come back in the future - if so, get back to doing ex consistently.  IF that does not help then contact MD  to see if return to PT is needed.  Education in ROM systems available to purchase to help increase ROM if he feels that would be helpful.   Skilled Intervention demo, education , instruction, activity progression.   Patient Response/Progress good tolerance.  see objective measures.   Education   Learner/Method Patient;Pictures/Video   Education Comments HEP   Plan   Home program Education in Optional products to assist with increasing jaw motion: 1)  Theabite jaw Motion System 2)  Orastretch Press System 3)  Jaw Dynasplint System  Exercises to continue: Posture - shoulder blade pinches Neck stretches - tilt to side and rotation (look to each side).  make sure to sit up tall Jaw opening stretch Jaw strength - resist opening  and closing Jaw Motion - protrusion (lower jaw forward),  lateral deviation (lower jaw to each side). Soft tissue work: [place]eter (Cheek) massage - gentle circular motions from top of your cheek bone to jawline,  30 seconds,  2-3 times on each side.   Plan for next session d/c PT   Total Session Time   Timed Code Treatment Minutes 30   Total Treatment Time (sum  of timed and untimed services) 30         DISCHARGE  Reason for Discharge: Patient has met all goals.    Equipment Issued: WeHealthte measurment tool    Discharge Plan: Patient to continue home program.    Referring Provider:  Prudence Matthews

## 2023-11-19 ENCOUNTER — HEALTH MAINTENANCE LETTER (OUTPATIENT)
Age: 48
End: 2023-11-19

## 2024-12-29 ENCOUNTER — HEALTH MAINTENANCE LETTER (OUTPATIENT)
Age: 49
End: 2024-12-29

## (undated) DEVICE — PACK T&A RIDGES

## (undated) DEVICE — DRSG GAUZE 4X4" TRAY

## (undated) DEVICE — Device

## (undated) DEVICE — NDL 18GA 1.5" 305196

## (undated) DEVICE — GLOVE BIOGEL PI MICRO SZ 8.0 48580

## (undated) DEVICE — BAG CLEAR TRASH 1.3M 39X33" P4040C

## (undated) DEVICE — ESU CORD BIPOLAR GREEN 10-4000

## (undated) DEVICE — LINEN FULL SHEET 5511

## (undated) DEVICE — LINEN HALF SHEET 5512

## (undated) DEVICE — ESU PENCIL W/HOLSTER E2350H

## (undated) DEVICE — SOL WATER IRRIG 1000ML BOTTLE 2F7114

## (undated) DEVICE — SUCTION CANISTER MEDIVAC LINER 3000ML W/LID 65651-530

## (undated) DEVICE — NDL SPINAL 22GA 7" QUINCKE 405149

## (undated) DEVICE — SPONGE COTTONOID 1/2X3" 80-1407

## (undated) DEVICE — ENDO TOOTH GUARD

## (undated) DEVICE — SOL NACL 0.9% IRRIG 1000ML BOTTLE 2F7124

## (undated) DEVICE — ESU ELEC BLADE 2.75" COATED/INSULATED E1455

## (undated) DEVICE — DRSG TELFA 2X3"

## (undated) DEVICE — ESU GROUND PAD ADULT W/CORD E7507

## (undated) RX ORDER — PROPOFOL 10 MG/ML
INJECTION, EMULSION INTRAVENOUS
Status: DISPENSED
Start: 2023-01-26

## (undated) RX ORDER — LIDOCAINE HYDROCHLORIDE 10 MG/ML
INJECTION, SOLUTION EPIDURAL; INFILTRATION; INTRACAUDAL; PERINEURAL
Status: DISPENSED
Start: 2023-01-26

## (undated) RX ORDER — CEFAZOLIN SODIUM 2 G/100ML
INJECTION, SOLUTION INTRAVENOUS
Status: DISPENSED
Start: 2023-03-07

## (undated) RX ORDER — ACETAMINOPHEN 325 MG/1
TABLET ORAL
Status: DISPENSED
Start: 2023-01-26

## (undated) RX ORDER — DEXAMETHASONE SODIUM PHOSPHATE 4 MG/ML
INJECTION, SOLUTION INTRA-ARTICULAR; INTRALESIONAL; INTRAMUSCULAR; INTRAVENOUS; SOFT TISSUE
Status: DISPENSED
Start: 2023-01-26

## (undated) RX ORDER — FENTANYL CITRATE 50 UG/ML
INJECTION, SOLUTION INTRAMUSCULAR; INTRAVENOUS
Status: DISPENSED
Start: 2023-03-07

## (undated) RX ORDER — FENTANYL CITRATE 50 UG/ML
INJECTION, SOLUTION INTRAMUSCULAR; INTRAVENOUS
Status: DISPENSED
Start: 2023-01-26

## (undated) RX ORDER — ONDANSETRON 2 MG/ML
INJECTION INTRAMUSCULAR; INTRAVENOUS
Status: DISPENSED
Start: 2023-01-26

## (undated) RX ORDER — LIDOCAINE HYDROCHLORIDE AND EPINEPHRINE 10; 10 MG/ML; UG/ML
INJECTION, SOLUTION INFILTRATION; PERINEURAL
Status: DISPENSED
Start: 2023-01-26

## (undated) RX ORDER — OXYMETAZOLINE HYDROCHLORIDE 0.05 G/100ML
SPRAY NASAL
Status: DISPENSED
Start: 2023-01-26

## (undated) RX ORDER — LIDOCAINE HYDROCHLORIDE 10 MG/ML
INJECTION, SOLUTION INFILTRATION; PERINEURAL
Status: DISPENSED
Start: 2023-03-07